# Patient Record
Sex: FEMALE | Race: WHITE | Employment: OTHER | URBAN - METROPOLITAN AREA
[De-identification: names, ages, dates, MRNs, and addresses within clinical notes are randomized per-mention and may not be internally consistent; named-entity substitution may affect disease eponyms.]

---

## 2017-01-25 ENCOUNTER — NURSE ONLY (OUTPATIENT)
Dept: FAMILY MEDICINE CLINIC | Facility: CLINIC | Age: 82
End: 2017-01-25

## 2017-01-25 DIAGNOSIS — M81.0 OSTEOPOROSIS: Primary | ICD-10-CM

## 2017-01-25 PROCEDURE — 96372 THER/PROPH/DIAG INJ SC/IM: CPT | Performed by: FAMILY MEDICINE

## 2017-01-26 ENCOUNTER — PATIENT OUTREACH (OUTPATIENT)
Dept: FAMILY MEDICINE CLINIC | Facility: CLINIC | Age: 82
End: 2017-01-26

## 2017-01-26 NOTE — PROGRESS NOTES
Called Pt. To inform that she was due for her Medicare Annual Wellness Visit. Scheduled for 02/17/17.

## 2017-02-17 ENCOUNTER — OFFICE VISIT (OUTPATIENT)
Dept: FAMILY MEDICINE CLINIC | Facility: CLINIC | Age: 82
End: 2017-02-17

## 2017-02-17 VITALS
DIASTOLIC BLOOD PRESSURE: 74 MMHG | HEART RATE: 98 BPM | WEIGHT: 127.81 LBS | BODY MASS INDEX: 25.42 KG/M2 | HEIGHT: 59.45 IN | SYSTOLIC BLOOD PRESSURE: 132 MMHG

## 2017-02-17 DIAGNOSIS — Z78.0 POSTMENOPAUSAL: ICD-10-CM

## 2017-02-17 DIAGNOSIS — E78.6 LOW HDL (UNDER 40): ICD-10-CM

## 2017-02-17 DIAGNOSIS — E78.00 HYPERCHOLESTEROLEMIA: Primary | ICD-10-CM

## 2017-02-17 DIAGNOSIS — J30.1 SEASONAL ALLERGIC RHINITIS DUE TO POLLEN: ICD-10-CM

## 2017-02-17 DIAGNOSIS — E03.9 ACQUIRED HYPOTHYROIDISM: ICD-10-CM

## 2017-02-17 DIAGNOSIS — Z00.00 ENCOUNTER FOR ANNUAL HEALTH EXAMINATION: ICD-10-CM

## 2017-02-17 DIAGNOSIS — M81.0 POSTMENOPAUSAL OSTEOPOROSIS: ICD-10-CM

## 2017-02-17 DIAGNOSIS — R79.89 ELEVATED SERUM CREATININE: ICD-10-CM

## 2017-02-17 PROCEDURE — 96160 PT-FOCUSED HLTH RISK ASSMT: CPT | Performed by: FAMILY MEDICINE

## 2017-02-17 NOTE — PATIENT INSTRUCTIONS
Routine Healthcare for Women   Routine checkups can find treatable problems early. For many medical problems, early treatment can help prevent more serious complications. The value of checkups and how often you have them depend mainly on your age.  Your per history of high cholesterol. Colorectal cancer test: if you are 48 or older.  Recommended tests include a yearly test for blood in the stool, called the fecal occult blood test (FOBT) or fecal immunochemical test (FIT), and one of the following tests:   s history. Many other tests are often done at routine checkups, but there is no current evidence that they are helpful as routine screening tests for healthy women. Examples of such tests are a CBC (complete blood count), thyroid tests, and urine tests.  Wh medical condition, such as diabetes. Varicella (chickenpox) if you have never had chickenpox. Zoster (shingles) vaccine: if you are 60 or older. The vaccine can help prevent shingles. It can also reduce the pain caused by shingles.    What other things http://www. idph.state. [place]/public/books/advin.htm  A link to the [employer]. This site has a lot of good information including definitions of the different types of Advance Directives.  It also has the State forms available on it's webs

## 2017-02-25 NOTE — PROGRESS NOTES
HPI:   Sunny Quinones is a 80year old female who presents for a MA (Medicare Advantage) Supervisit (Once per calendar year).          Patient Care Team: Patient Care Team:  Ofe Necessary, DO as PCP - [facility])    Patient Active Pr that includes hysterectomy ();  delivery only (, , , ); and Cataract extraction w/  intraocular lens implant (Bilateral, 8/15/13, 13).     Her family history includes Cancer in her brother, brother, maternal grandfather, and no JVD, supple, symmetrical, trachea midline and thyroid not enlarged, symmetric, no tenderness/mass/nodules  Lungs: clear to auscultation bilaterally  Heart: S1, S2 normal, no murmur, click, rub or gallop, regular rate and rhythm  Abdomen: soft, non-tende Future  - LIPID PANEL; Future    3. Acquired hypothyroidism  Stable. Continue levothyroxine 50 µg daily.  - CBC W/DIFF; Future  - TSH; Future  - T4 FREE; Future    4. Postmenopausal osteoporosis  Patient counseled on calcium and vitamin D supplementation. daily aspirin therapy For the following reasons:   Patient decided that the risks of aspirin therapy outweigh the benefits and declines aspirin therapy          Diet assessment: good     Advanced Directive:  Living Will on file in [address] [place] [address] Rd?   Kulwant Munguia activities? : No    Memory Problems?: No      Fall/Risk Assessment     Do you have 3 or more medical conditions?: 0-No    Have you fallen in the last 12 months?: 0-No    Do you accidently lose urine?: 0-No    Do you have difficulty seeing?: 0-No    Do you display for this patient. Update Health Maintenance if applicable    Flex Sigmoidoscopy Screen every 10 years No results found for this or any previous visit. No flowsheet data found.      Fecal Occult Blood Annually No results found for: FOB No flowsheet d No flowsheet data found. BUN  Annually BUN (mg/dL)   Date Value   09/15/2016 14   03/26/2014 11     UREA NITROGEN (BUN) (mg/dL)   Date Value   02/12/2015 16    No flowsheet data found.      Drug Serum Conc  Annually No results found for: DIGOXIN, DIG,

## 2017-03-01 ENCOUNTER — HOSPITAL ENCOUNTER (OUTPATIENT)
Dept: BONE DENSITY | Age: 82
Discharge: HOME | End: 2017-03-01
Attending: FAMILY MEDICINE
Payer: MEDICARE

## 2017-03-01 DIAGNOSIS — Z78.0 POSTMENOPAUSAL: ICD-10-CM

## 2017-03-01 DIAGNOSIS — M81.0 POSTMENOPAUSAL OSTEOPOROSIS: ICD-10-CM

## 2017-03-01 PROCEDURE — 77080 DXA BONE DENSITY AXIAL: CPT

## 2017-03-07 ENCOUNTER — TELEPHONE (OUTPATIENT)
Dept: FAMILY MEDICINE CLINIC | Facility: CLINIC | Age: 82
End: 2017-03-07

## 2017-03-07 NOTE — TELEPHONE ENCOUNTER
----- Message from Ronda Harp DO sent at 3/7/2017  1:03 AM CST -----  Please call patient: Bone density improving with the Prolia.   Please verify patient is taking a calcium and vitamin D combination supplement such as Citracal Caltrate or Viactiv, i

## 2017-04-19 RX ORDER — SIMVASTATIN 40 MG
TABLET ORAL
Qty: 30 TABLET | Refills: 3 | Status: SHIPPED | OUTPATIENT
Start: 2017-04-19 | End: 2017-08-17

## 2017-08-17 ENCOUNTER — OFFICE VISIT (OUTPATIENT)
Dept: FAMILY MEDICINE CLINIC | Facility: CLINIC | Age: 82
End: 2017-08-17

## 2017-08-17 VITALS
HEIGHT: 59.45 IN | RESPIRATION RATE: 16 BRPM | SYSTOLIC BLOOD PRESSURE: 132 MMHG | HEART RATE: 86 BPM | BODY MASS INDEX: 25.79 KG/M2 | DIASTOLIC BLOOD PRESSURE: 74 MMHG | WEIGHT: 129.63 LBS

## 2017-08-17 DIAGNOSIS — E78.6 LOW HDL (UNDER 40): ICD-10-CM

## 2017-08-17 DIAGNOSIS — R73.01 IMPAIRED FASTING GLUCOSE: ICD-10-CM

## 2017-08-17 DIAGNOSIS — M81.0 POSTMENOPAUSAL OSTEOPOROSIS: ICD-10-CM

## 2017-08-17 DIAGNOSIS — E78.00 HYPERCHOLESTEROLEMIA: Primary | ICD-10-CM

## 2017-08-17 DIAGNOSIS — E03.9 ACQUIRED HYPOTHYROIDISM: ICD-10-CM

## 2017-08-17 DIAGNOSIS — Z79.899 ENCOUNTER FOR LONG-TERM CURRENT USE OF MEDICATION: ICD-10-CM

## 2017-08-17 PROCEDURE — 99214 OFFICE O/P EST MOD 30 MIN: CPT | Performed by: FAMILY MEDICINE

## 2017-08-17 RX ORDER — LEVOTHYROXINE SODIUM 0.05 MG/1
50 TABLET ORAL
Qty: 90 TABLET | Refills: 3 | Status: SHIPPED | OUTPATIENT
Start: 2017-08-17 | End: 2018-09-10

## 2017-08-17 RX ORDER — SIMVASTATIN 40 MG
40 TABLET ORAL NIGHTLY
Qty: 90 TABLET | Refills: 3 | Status: SHIPPED | OUTPATIENT
Start: 2017-08-17 | End: 2018-01-17

## 2017-08-17 NOTE — PROGRESS NOTES
Kandy Bhatia is a 80year old female. HPI:     Hypothyroidism: Stable. Patient has been on the same dose of levothyroxine for about 8 years. Denies side effects the levothyroxine such as chest pain, palpitations, changes in hair skin or nails. Smokeless tobacco: Never Used                      Alcohol use:  No                  Family History   Problem Relation Age of Onset   • Heart Disorder Father    • Renal Disease Mother      renal failure   • Stroke Paternal Gr weakness  PSYCH: affect normal, no apparent thought d/o    DATA:    Component      Latest Ref Rng 9/15/2016   Glucose      70-99 mg/dL 109 (H)   BUN      8-20 mg/dL 14   CREATININE      0.55-1.02 mg/dL 1.04 (H)   GFR      >=60 50 (L)   CALCIUM      8.3-10. Encounter for long-term current use of medication  Medication use, risks, benefits, side effects and precautions discussed, patient verbalizes understanding. Questions encouraged and answered to patient's satisfaction.     6. Impaired fasting glucose  Check

## 2017-08-17 NOTE — PATIENT INSTRUCTIONS
We will call you when the Prolia comes in. Preventing Osteoporosis: Meeting Your Calcium Needs    Your body needs calcium to build and repair bones. But it can't make calcium on its own.  That's why it's important to eat calcium-rich food instructions.

## 2017-08-21 ENCOUNTER — NURSE ONLY (OUTPATIENT)
Dept: FAMILY MEDICINE CLINIC | Facility: CLINIC | Age: 82
End: 2017-08-21

## 2017-08-21 ENCOUNTER — LAB ENCOUNTER (OUTPATIENT)
Dept: LAB | Age: 82
End: 2017-08-21
Attending: FAMILY MEDICINE
Payer: MEDICARE

## 2017-08-21 DIAGNOSIS — R79.89 ELEVATED SERUM CREATININE: ICD-10-CM

## 2017-08-21 DIAGNOSIS — E03.9 ACQUIRED HYPOTHYROIDISM: ICD-10-CM

## 2017-08-21 DIAGNOSIS — M81.0 POSTMENOPAUSAL OSTEOPOROSIS: ICD-10-CM

## 2017-08-21 DIAGNOSIS — R73.01 IMPAIRED FASTING GLUCOSE: ICD-10-CM

## 2017-08-21 DIAGNOSIS — E78.00 HYPERCHOLESTEROLEMIA: ICD-10-CM

## 2017-08-21 DIAGNOSIS — E78.6 LOW HDL (UNDER 40): ICD-10-CM

## 2017-08-21 LAB
25-HYDROXYVITAMIN D (TOTAL): 34.1 NG/ML (ref 30–100)
ALBUMIN SERPL-MCNC: 3.8 G/DL (ref 3.5–4.8)
ALP LIVER SERPL-CCNC: 48 U/L (ref 55–142)
ALT SERPL-CCNC: 27 U/L (ref 14–54)
AST SERPL-CCNC: 23 U/L (ref 15–41)
BASOPHILS # BLD AUTO: 0.06 X10(3) UL (ref 0–0.1)
BASOPHILS NFR BLD AUTO: 1 %
BILIRUB SERPL-MCNC: 0.8 MG/DL (ref 0.1–2)
BUN BLD-MCNC: 17 MG/DL (ref 8–20)
CALCIUM BLD-MCNC: 9.7 MG/DL (ref 8.3–10.3)
CHLORIDE: 104 MMOL/L (ref 101–111)
CHOLEST SMN-MCNC: 136 MG/DL (ref ?–200)
CO2: 28 MMOL/L (ref 22–32)
CREAT BLD-MCNC: 1.02 MG/DL (ref 0.55–1.02)
EOSINOPHIL # BLD AUTO: 0.15 X10(3) UL (ref 0–0.3)
EOSINOPHIL NFR BLD AUTO: 2.6 %
ERYTHROCYTE [DISTWIDTH] IN BLOOD BY AUTOMATED COUNT: 12.6 % (ref 11.5–16)
FREE T4: 1.1 NG/DL (ref 0.9–1.8)
GLUCOSE BLD-MCNC: 117 MG/DL (ref 70–99)
HCT VFR BLD AUTO: 42.5 % (ref 34–50)
HDLC SERPL-MCNC: 61 MG/DL (ref 45–?)
HDLC SERPL: 2.23 {RATIO} (ref ?–4.44)
HGB BLD-MCNC: 14 G/DL (ref 12–16)
IMMATURE GRANULOCYTE COUNT: 0.01 X10(3) UL (ref 0–1)
IMMATURE GRANULOCYTE RATIO %: 0.2 %
LDLC SERPL CALC-MCNC: 57 MG/DL (ref ?–130)
LDLC SERPL-MCNC: 18 MG/DL (ref 5–40)
LYMPHOCYTES # BLD AUTO: 1.54 X10(3) UL (ref 0.9–4)
LYMPHOCYTES NFR BLD AUTO: 26.7 %
M PROTEIN MFR SERPL ELPH: 7.6 G/DL (ref 6.1–8.3)
MCH RBC QN AUTO: 31.4 PG (ref 27–33.2)
MCHC RBC AUTO-ENTMCNC: 32.9 G/DL (ref 31–37)
MCV RBC AUTO: 95.3 FL (ref 81–100)
MONOCYTES # BLD AUTO: 0.55 X10(3) UL (ref 0.1–0.6)
MONOCYTES NFR BLD AUTO: 9.5 %
NEUTROPHIL ABS PRELIM: 3.45 X10 (3) UL (ref 1.3–6.7)
NEUTROPHILS # BLD AUTO: 3.45 X10(3) UL (ref 1.3–6.7)
NEUTROPHILS NFR BLD AUTO: 60 %
NONHDLC SERPL-MCNC: 75 MG/DL (ref ?–130)
PLATELET # BLD AUTO: 325 10(3)UL (ref 150–450)
POTASSIUM SERPL-SCNC: 4 MMOL/L (ref 3.6–5.1)
RBC # BLD AUTO: 4.46 X10(6)UL (ref 3.8–5.1)
RED CELL DISTRIBUTION WIDTH-SD: 44.2 FL (ref 35.1–46.3)
SODIUM SERPL-SCNC: 138 MMOL/L (ref 136–144)
TRIGLYCERIDES: 92 MG/DL (ref ?–150)
TSI SER-ACNC: 1.69 MIU/ML (ref 0.35–5.5)
WBC # BLD AUTO: 5.8 X10(3) UL (ref 4–13)

## 2017-08-21 PROCEDURE — 36415 COLL VENOUS BLD VENIPUNCTURE: CPT | Performed by: FAMILY MEDICINE

## 2017-08-21 PROCEDURE — 96372 THER/PROPH/DIAG INJ SC/IM: CPT | Performed by: FAMILY MEDICINE

## 2017-08-22 LAB
EST. AVERAGE GLUCOSE BLD GHB EST-MCNC: 131 MG/DL (ref 68–126)
HBA1C MFR BLD HPLC: 6.2 % (ref ?–5.7)

## 2017-08-29 ENCOUNTER — TELEPHONE (OUTPATIENT)
Dept: FAMILY MEDICINE CLINIC | Facility: CLINIC | Age: 82
End: 2017-08-29

## 2017-08-29 NOTE — TELEPHONE ENCOUNTER
----- Message from Jori Solis DO sent at 8/28/2017 10:52 PM CDT -----  Please call pt: labs normal or stable.

## 2018-01-17 ENCOUNTER — OFFICE VISIT (OUTPATIENT)
Dept: FAMILY MEDICINE CLINIC | Facility: CLINIC | Age: 83
End: 2018-01-17

## 2018-01-17 VITALS
HEIGHT: 59.45 IN | BODY MASS INDEX: 25.94 KG/M2 | SYSTOLIC BLOOD PRESSURE: 134 MMHG | RESPIRATION RATE: 16 BRPM | HEART RATE: 92 BPM | WEIGHT: 130.38 LBS | DIASTOLIC BLOOD PRESSURE: 72 MMHG

## 2018-01-17 DIAGNOSIS — Z00.00 MEDICARE ANNUAL WELLNESS VISIT, SUBSEQUENT: Primary | ICD-10-CM

## 2018-01-17 DIAGNOSIS — Z23 NEED FOR VACCINATION: ICD-10-CM

## 2018-01-17 DIAGNOSIS — E78.6 LOW HDL (UNDER 40): ICD-10-CM

## 2018-01-17 DIAGNOSIS — E03.9 ACQUIRED HYPOTHYROIDISM: ICD-10-CM

## 2018-01-17 DIAGNOSIS — M89.9 BONE DISORDER: ICD-10-CM

## 2018-01-17 DIAGNOSIS — M81.0 POST-MENOPAUSAL OSTEOPOROSIS: ICD-10-CM

## 2018-01-17 DIAGNOSIS — E78.00 HYPERCHOLESTEROLEMIA: ICD-10-CM

## 2018-01-17 DIAGNOSIS — J30.1 SEASONAL ALLERGIC RHINITIS DUE TO POLLEN: ICD-10-CM

## 2018-01-17 PROCEDURE — G0439 PPPS, SUBSEQ VISIT: HCPCS | Performed by: FAMILY MEDICINE

## 2018-01-17 PROCEDURE — 96160 PT-FOCUSED HLTH RISK ASSMT: CPT | Performed by: FAMILY MEDICINE

## 2018-01-17 RX ORDER — SIMVASTATIN 40 MG
40 TABLET ORAL NIGHTLY
Qty: 90 TABLET | Refills: 3 | Status: SHIPPED | OUTPATIENT
Start: 2018-01-17 | End: 2019-02-21

## 2018-01-17 NOTE — PROGRESS NOTES
HPI:   Milla Wilkinson is a 80year old female who presents for a MA (Medicare Advantage) Supervisit (Once per calendar year).            Fall/Risk Assessment   She has been screened for Falls and is low risk: Fall/Risk Scorin    Cognitive Assessm pollen     Bone disorder    Wt Readings from Last 3 Encounters:  01/17/18 : 130 lb 6.4 oz  08/17/17 : 129 lb 9.6 oz  02/17/17 : 127 lb 12.8 oz     Last Cholesterol Labs:     Lab Results  Component Value Date   CHOLEST 136 08/21/2017   HDL 61 08/21/2017   L maternal grandfather, and sister; Heart Disorder in her father; Renal Disease in her mother and paternal grandmother; Stroke in her maternal grandmother and paternal grandfather. SOCIAL HISTORY:   She  reports that she has never smoked.  She has never use edema  Pulses: 2+ and symmetric  Lymph nodes: No cervical or supraclavicular adenopathy  Neurologic: Grossly normal  Psych: Normal affect    Vaccination History     Immunization History   Administered Date(s) Administered   • Influenza Vaccine, High Dose, seasonally. 8. Need for vaccination  - PNEUMOCOCCAL IMM (PNEUMOVAX)        PLAN SUMMARY:     AS ABOVE     Diet assessment: good     PLAN:  The patient indicates understanding of these issues and agrees to the plan.   Reinforced healthy diet, lifestyle, a Glaucoma Screening      Ophthalmology Visit Annually: Diabetics, FHx Glaucoma, AA>50, > 65 No flowsheet data found.     Bone Density Screening      Dexascan Every two years Last Dexa Scan:   XR DEXA BONE DENSITOMETRY (CPT=77080) 03/01/2017    No f

## 2018-01-17 NOTE — PATIENT INSTRUCTIONS
Please get your blood tests done, then make         [address] [place]    Lab appointments can now be scheduled online at [email or web address]    · Go to [email or web address]  · In Search type Lab  · Click \"Lab services\"  · Click \"S you are over 70, ask your healthcare provider how often you should have a mammogram.   Pap test: at least every 3 years if you have ever had sex and have not had your uterus removed.  Your healthcare provider may recommend more frequent screening if you hav you have an increased risk (for example, because you smoke or do not get regular exercise). Osteoporosis is a disease that thins and weakens bones to the point where they break easily.    Hearing test: if you are 72 or older   Vision test: if you are 65 or adults, age 15 to 25 years, who have not had hepatitis or a hepatitis shot and for all adults who are at risk of infection.  This includes, for example, women who have more than 1 sex partner or whose partner has more than 1 partner, or who have a sexually long-term relationship with just one partner who has no other partners. Hormone use: During or after menopause, discuss the risks and benefits of use of estrogen and progesterone replacement with your healthcare provider. Osteoporosis prevention:   Lino Cummings

## 2018-01-19 PROBLEM — M89.9 BONE DISORDER: Status: ACTIVE | Noted: 2018-01-19

## 2018-01-19 PROBLEM — Z78.0 POSTMENOPAUSAL: Status: RESOLVED | Noted: 2017-02-17 | Resolved: 2018-01-19

## 2018-01-19 PROBLEM — J30.1 SEASONAL ALLERGIC RHINITIS DUE TO POLLEN: Status: ACTIVE | Noted: 2018-01-19

## 2018-01-19 PROBLEM — M81.0 POSTMENOPAUSAL OSTEOPOROSIS: Status: RESOLVED | Noted: 2017-02-17 | Resolved: 2018-01-19

## 2018-01-19 PROBLEM — M81.0 POST-MENOPAUSAL OSTEOPOROSIS: Status: ACTIVE | Noted: 2017-02-17

## 2018-01-25 ENCOUNTER — LAB ENCOUNTER (OUTPATIENT)
Dept: LAB | Age: 83
End: 2018-01-25
Attending: FAMILY MEDICINE
Payer: MEDICARE

## 2018-01-25 ENCOUNTER — NURSE ONLY (OUTPATIENT)
Dept: FAMILY MEDICINE CLINIC | Facility: CLINIC | Age: 83
End: 2018-01-25

## 2018-01-25 DIAGNOSIS — E03.9 ACQUIRED HYPOTHYROIDISM: ICD-10-CM

## 2018-01-25 DIAGNOSIS — E78.6 LOW HDL (UNDER 40): ICD-10-CM

## 2018-01-25 DIAGNOSIS — E78.00 HYPERCHOLESTEROLEMIA: ICD-10-CM

## 2018-01-25 DIAGNOSIS — M89.9 BONE DISORDER: ICD-10-CM

## 2018-01-25 LAB
25-HYDROXYVITAMIN D (TOTAL): 24.4 NG/ML (ref 30–100)
ALBUMIN SERPL-MCNC: 3.9 G/DL (ref 3.5–4.8)
ALP LIVER SERPL-CCNC: 44 U/L (ref 55–142)
ALT SERPL-CCNC: 25 U/L (ref 14–54)
AST SERPL-CCNC: 23 U/L (ref 15–41)
BASOPHILS # BLD AUTO: 0.03 X10(3) UL (ref 0–0.1)
BASOPHILS NFR BLD AUTO: 0.5 %
BILIRUB SERPL-MCNC: 0.5 MG/DL (ref 0.1–2)
BUN BLD-MCNC: 15 MG/DL (ref 8–20)
CALCIUM BLD-MCNC: 9.1 MG/DL (ref 8.3–10.3)
CHLORIDE: 106 MMOL/L (ref 101–111)
CHOLEST SMN-MCNC: 138 MG/DL (ref ?–200)
CO2: 27 MMOL/L (ref 22–32)
CREAT BLD-MCNC: 1.01 MG/DL (ref 0.55–1.02)
EOSINOPHIL # BLD AUTO: 0.11 X10(3) UL (ref 0–0.3)
EOSINOPHIL NFR BLD AUTO: 1.7 %
ERYTHROCYTE [DISTWIDTH] IN BLOOD BY AUTOMATED COUNT: 12.6 % (ref 11.5–16)
FREE T4: 1.1 NG/DL (ref 0.9–1.8)
GLUCOSE BLD-MCNC: 115 MG/DL (ref 70–99)
HAV IGM SER QL: 2.3 MG/DL (ref 1.7–3)
HCT VFR BLD AUTO: 43.4 % (ref 34–50)
HDLC SERPL-MCNC: 53 MG/DL (ref 45–?)
HDLC SERPL: 2.6 {RATIO} (ref ?–4.44)
HGB BLD-MCNC: 14.3 G/DL (ref 12–16)
IMMATURE GRANULOCYTE COUNT: 0.01 X10(3) UL (ref 0–1)
IMMATURE GRANULOCYTE RATIO %: 0.2 %
LDLC SERPL CALC-MCNC: 61 MG/DL (ref ?–130)
LYMPHOCYTES # BLD AUTO: 1.69 X10(3) UL (ref 0.9–4)
LYMPHOCYTES NFR BLD AUTO: 25.5 %
M PROTEIN MFR SERPL ELPH: 7.8 G/DL (ref 6.1–8.3)
MCH RBC QN AUTO: 31.7 PG (ref 27–33.2)
MCHC RBC AUTO-ENTMCNC: 32.9 G/DL (ref 31–37)
MCV RBC AUTO: 96.2 FL (ref 81–100)
MONOCYTES # BLD AUTO: 0.53 X10(3) UL (ref 0.1–0.6)
MONOCYTES NFR BLD AUTO: 8 %
NEUTROPHIL ABS PRELIM: 4.25 X10 (3) UL (ref 1.3–6.7)
NEUTROPHILS # BLD AUTO: 4.25 X10(3) UL (ref 1.3–6.7)
NEUTROPHILS NFR BLD AUTO: 64.1 %
NONHDLC SERPL-MCNC: 85 MG/DL (ref ?–130)
PHOSPHATE SERPL-MCNC: 3.1 MG/DL (ref 2.5–4.9)
PLATELET # BLD AUTO: 316 10(3)UL (ref 150–450)
POTASSIUM SERPL-SCNC: 4.2 MMOL/L (ref 3.6–5.1)
RBC # BLD AUTO: 4.51 X10(6)UL (ref 3.8–5.1)
RED CELL DISTRIBUTION WIDTH-SD: 45.2 FL (ref 35.1–46.3)
SODIUM SERPL-SCNC: 137 MMOL/L (ref 136–144)
TRIGL SERPL-MCNC: 121 MG/DL (ref ?–150)
TSI SER-ACNC: 1.61 MIU/ML (ref 0.35–5.5)
VLDLC SERPL CALC-MCNC: 24 MG/DL (ref 5–40)
WBC # BLD AUTO: 6.6 X10(3) UL (ref 4–13)

## 2018-01-25 PROCEDURE — 80061 LIPID PANEL: CPT

## 2018-01-25 PROCEDURE — 90732 PPSV23 VACC 2 YRS+ SUBQ/IM: CPT | Performed by: FAMILY MEDICINE

## 2018-01-25 PROCEDURE — 36415 COLL VENOUS BLD VENIPUNCTURE: CPT

## 2018-01-25 PROCEDURE — 80053 COMPREHEN METABOLIC PANEL: CPT

## 2018-01-25 PROCEDURE — 84443 ASSAY THYROID STIM HORMONE: CPT

## 2018-01-25 PROCEDURE — 85025 COMPLETE CBC W/AUTO DIFF WBC: CPT

## 2018-01-25 PROCEDURE — 84100 ASSAY OF PHOSPHORUS: CPT

## 2018-01-25 PROCEDURE — 84439 ASSAY OF FREE THYROXINE: CPT

## 2018-01-25 PROCEDURE — 83735 ASSAY OF MAGNESIUM: CPT

## 2018-01-25 PROCEDURE — G0009 ADMIN PNEUMOCOCCAL VACCINE: HCPCS | Performed by: FAMILY MEDICINE

## 2018-01-25 PROCEDURE — 82306 VITAMIN D 25 HYDROXY: CPT

## 2018-01-25 PROCEDURE — 96372 THER/PROPH/DIAG INJ SC/IM: CPT | Performed by: FAMILY MEDICINE

## 2018-01-25 NOTE — PROGRESS NOTES
Pt here for Prolia and Pneumovax 23 injections, pt tolerated injections well. Next Prolia injection due 7/25/18.

## 2018-01-29 ENCOUNTER — TELEPHONE (OUTPATIENT)
Dept: FAMILY MEDICINE CLINIC | Facility: CLINIC | Age: 83
End: 2018-01-29

## 2018-01-29 DIAGNOSIS — E55.9 VITAMIN D DEFICIENCY: Primary | ICD-10-CM

## 2018-01-29 RX ORDER — ERGOCALCIFEROL 1.25 MG/1
50000 CAPSULE ORAL WEEKLY
Qty: 12 CAPSULE | Refills: 0 | Status: SHIPPED | OUTPATIENT
Start: 2018-01-29 | End: 2018-04-29

## 2018-01-29 NOTE — TELEPHONE ENCOUNTER
Pt informed of test results and recommendations. Pt verbalized understanding and had no questions at this time. Prescription sent to local pharmacy, future orders in the system to be completed in 3 months.

## 2018-01-29 NOTE — PROGRESS NOTES
sugar mildly elevated -  reduce carbs and sugars  Vit D low - Vit D 50,000 units weekly x 12 wks, then recheck Vit D upon completion  -rest of labs ok

## 2018-01-29 NOTE — TELEPHONE ENCOUNTER
----- Message from Shazia Harper MD sent at 1/29/2018  9:34 AM CST -----  sugar mildly elevated -  reduce carbs and sugars  Vit D low - Vit D 50,000 units weekly x 12 wks, then recheck Vit D upon completion  -rest of labs ok

## 2018-04-24 ENCOUNTER — LAB ENCOUNTER (OUTPATIENT)
Dept: LAB | Age: 83
End: 2018-04-24
Attending: FAMILY MEDICINE
Payer: MEDICARE

## 2018-04-24 DIAGNOSIS — E55.9 VITAMIN D DEFICIENCY: ICD-10-CM

## 2018-04-24 PROCEDURE — 82306 VITAMIN D 25 HYDROXY: CPT

## 2018-04-24 PROCEDURE — 36415 COLL VENOUS BLD VENIPUNCTURE: CPT

## 2018-04-30 ENCOUNTER — TELEPHONE (OUTPATIENT)
Dept: FAMILY MEDICINE CLINIC | Facility: CLINIC | Age: 83
End: 2018-04-30

## 2018-05-03 NOTE — TELEPHONE ENCOUNTER
Per Dr. Fran Mariano, the patient was informed that her vitamin d is improved. Patient verbalized understanding.

## 2018-08-08 ENCOUNTER — OFFICE VISIT (OUTPATIENT)
Dept: FAMILY MEDICINE CLINIC | Facility: CLINIC | Age: 83
End: 2018-08-08

## 2018-08-08 VITALS
HEART RATE: 83 BPM | RESPIRATION RATE: 16 BRPM | HEIGHT: 59.45 IN | SYSTOLIC BLOOD PRESSURE: 132 MMHG | WEIGHT: 131.38 LBS | BODY MASS INDEX: 26.13 KG/M2 | DIASTOLIC BLOOD PRESSURE: 70 MMHG

## 2018-08-08 DIAGNOSIS — E78.00 HYPERCHOLESTEROLEMIA: ICD-10-CM

## 2018-08-08 DIAGNOSIS — Z79.899 ENCOUNTER FOR LONG-TERM CURRENT USE OF MEDICATION: ICD-10-CM

## 2018-08-08 DIAGNOSIS — M81.0 POST-MENOPAUSAL OSTEOPOROSIS: Primary | ICD-10-CM

## 2018-08-08 DIAGNOSIS — E78.6 LOW HDL (UNDER 40): ICD-10-CM

## 2018-08-08 DIAGNOSIS — E03.9 ACQUIRED HYPOTHYROIDISM: ICD-10-CM

## 2018-08-08 PROCEDURE — 99214 OFFICE O/P EST MOD 30 MIN: CPT | Performed by: FAMILY MEDICINE

## 2018-08-08 PROCEDURE — 96372 THER/PROPH/DIAG INJ SC/IM: CPT | Performed by: FAMILY MEDICINE

## 2018-08-08 RX ORDER — SIMVASTATIN 40 MG
40 TABLET ORAL NIGHTLY
Qty: 90 TABLET | Refills: 3 | Status: CANCELLED | OUTPATIENT
Start: 2018-08-08

## 2018-08-08 RX ORDER — LEVOTHYROXINE SODIUM 0.05 MG/1
50 TABLET ORAL
Qty: 90 TABLET | Refills: 3 | Status: CANCELLED | OUTPATIENT
Start: 2018-08-08

## 2018-08-08 NOTE — PROGRESS NOTES
Yves Quintero is a 80year old female. HPI:     Hypothyroidism:   Stable. Patient has been on the same dose of levothyroxine for about 8 or more years.     Denies side effects the levothyroxine such as chest pain, palpitations, changes in hair Smokeless tobacco: Never Used                      Alcohol use:  No                  Family History   Problem Relation Age of Onset   • Heart Disorder Father    • Renal Disease Mother      renal failure   • Stroke no masses, no HSM  EXTREMITIES: no cyanosis or clubbing  NEURO: Alert and Oriented x3, CN II-XII grossly intact, no focal weakness  PSYCH: affect normal, no apparent thought d/o      DATA:    Component      Latest Ref Rng & Units 1/25/2018 8/21/2017   Gluc THYROXINE); Future  - VITAMIN D, 25-HYDROXY; Future  - MAGNESIUM; Future  - PHOSPHORUS; Future    2. Hypercholesterolemia  Stable. Continue simvastatin 40 mg daily. Check labs in 6 months.    - COMP METABOLIC PANEL (14); Future  - LIPID PANEL;  Future

## 2018-08-08 NOTE — PATIENT INSTRUCTIONS
Preventing Osteoporosis: Meeting Your Calcium Needs    Your body needs calcium to build and repair bones. But it can't make calcium on its own. That's why it's important to eat calcium-rich foods. Some foods are naturally rich in calcium.  Others have josé miguel

## 2018-09-10 DIAGNOSIS — E03.9 ACQUIRED HYPOTHYROIDISM: ICD-10-CM

## 2018-09-10 DIAGNOSIS — Z79.899 ENCOUNTER FOR LONG-TERM CURRENT USE OF MEDICATION: ICD-10-CM

## 2018-09-11 RX ORDER — LEVOTHYROXINE SODIUM 0.05 MG/1
TABLET ORAL
Qty: 90 TABLET | Refills: 1 | Status: SHIPPED | OUTPATIENT
Start: 2018-09-11 | End: 2019-02-21

## 2019-02-21 ENCOUNTER — OFFICE VISIT (OUTPATIENT)
Dept: FAMILY MEDICINE CLINIC | Facility: CLINIC | Age: 84
End: 2019-02-21
Payer: MEDICARE

## 2019-02-21 VITALS
WEIGHT: 128 LBS | HEART RATE: 84 BPM | HEIGHT: 59.75 IN | BODY MASS INDEX: 25.13 KG/M2 | SYSTOLIC BLOOD PRESSURE: 110 MMHG | DIASTOLIC BLOOD PRESSURE: 60 MMHG

## 2019-02-21 DIAGNOSIS — E03.9 ACQUIRED HYPOTHYROIDISM: ICD-10-CM

## 2019-02-21 DIAGNOSIS — M89.9 BONE DISORDER: ICD-10-CM

## 2019-02-21 DIAGNOSIS — Z79.899 ENCOUNTER FOR LONG-TERM CURRENT USE OF MEDICATION: ICD-10-CM

## 2019-02-21 DIAGNOSIS — M81.0 POST-MENOPAUSAL OSTEOPOROSIS: ICD-10-CM

## 2019-02-21 DIAGNOSIS — Z00.00 MEDICARE ANNUAL WELLNESS VISIT, SUBSEQUENT: Primary | ICD-10-CM

## 2019-02-21 DIAGNOSIS — J30.1 SEASONAL ALLERGIC RHINITIS DUE TO POLLEN: ICD-10-CM

## 2019-02-21 DIAGNOSIS — E78.6 LOW HDL (UNDER 40): ICD-10-CM

## 2019-02-21 DIAGNOSIS — E78.00 HYPERCHOLESTEROLEMIA: ICD-10-CM

## 2019-02-21 PROCEDURE — 99397 PER PM REEVAL EST PAT 65+ YR: CPT | Performed by: FAMILY MEDICINE

## 2019-02-21 PROCEDURE — G0439 PPPS, SUBSEQ VISIT: HCPCS | Performed by: FAMILY MEDICINE

## 2019-02-21 PROCEDURE — 96160 PT-FOCUSED HLTH RISK ASSMT: CPT | Performed by: FAMILY MEDICINE

## 2019-02-21 RX ORDER — SIMVASTATIN 40 MG
40 TABLET ORAL NIGHTLY
Qty: 90 TABLET | Refills: 3 | Status: SHIPPED | OUTPATIENT
Start: 2019-02-21 | End: 2019-06-26

## 2019-02-21 RX ORDER — LEVOTHYROXINE SODIUM 0.05 MG/1
50 TABLET ORAL
Qty: 90 TABLET | Refills: 3 | Status: SHIPPED | OUTPATIENT
Start: 2019-02-21 | End: 2019-06-26

## 2019-02-21 NOTE — PATIENT INSTRUCTIONS
Please get blood tests done at your earliest convenience. After we have called you with the results of the blood tests you can make an appointment to see nurse Ian Colvin or nurse Leisa Jaramillo for your Prolia injection.               Recommended Website

## 2019-02-21 NOTE — PROGRESS NOTES
HPI:   Kandy Bhatia is a 80year old female who presents for a MA (Medicare Advantage) Supervisit (Once per calendar year).             Fall/Risk Assessment   She has been screened for Falls and is low risk: Fall/Risk Scorin    Cognitive Assess Encounters:  02/21/19 : 128 lb  10/20/18 : 130 lb  08/08/18 : 131 lb 6.4 oz     Last Cholesterol Labs:   Lab Results   Component Value Date    CHOLEST 138 01/25/2018    HDL 53 01/25/2018    LDL 61 01/25/2018    TRIG 121 01/25/2018          Last Chemistry L grandfather. SOCIAL HISTORY:   She  reports that  has never smoked. she has never used smokeless tobacco. She reports that she does not drink alcohol or use drugs.      REVIEW OF SYSTEMS:   GENERAL: feels well overall  SKIN: denies any unusual skin lesion strength and tone. Normal symmetric reflexes.  Normal coordination and gait  Psych: Normal affect    Vaccination History     Immunization History   Administered Date(s) Administered   • FLU VACC High Dose 72 YRS & Older PRSV Free (54026) 10/01/2014, 09/28/2 long-term current use of medication  Medication use, risks, benefits, side effects and precautions discussed, patient verbalizes understanding. Questions encouraged and answered to patient's satisfaction.    - Levothyroxine Sodium 50 MCG Oral Tab;  Take 1 t Value   02/12/2015 125 (H)          Cardiovascular Disease Screening     LDL Annually LDL Cholesterol (mg/dL)   Date Value   01/25/2018 61     LDL-CHOLESTEROL (mg/dL (calc))   Date Value   02/12/2015 53        EKG - w/ Initial Preventative Physical Exam on Factor VIII or IX concentrates   Clients of institutions for the mentally retarded   Persons who live in the same house as a HepB virus carrier   Homosexual men   Illicit injectable drug abusers     Tetanus Toxoid  Only covered with a cut with metal- TD an

## 2019-02-27 ENCOUNTER — LAB ENCOUNTER (OUTPATIENT)
Dept: LAB | Age: 84
End: 2019-02-27
Attending: FAMILY MEDICINE
Payer: MEDICARE

## 2019-02-27 DIAGNOSIS — E78.00 HYPERCHOLESTEROLEMIA: ICD-10-CM

## 2019-02-27 DIAGNOSIS — E03.9 ACQUIRED HYPOTHYROIDISM: ICD-10-CM

## 2019-02-27 DIAGNOSIS — M81.0 POST-MENOPAUSAL OSTEOPOROSIS: ICD-10-CM

## 2019-02-27 LAB
ALBUMIN SERPL-MCNC: 3.9 G/DL (ref 3.4–5)
ALBUMIN/GLOB SERPL: 1 {RATIO} (ref 1–2)
ALP LIVER SERPL-CCNC: 57 U/L (ref 55–142)
ALT SERPL-CCNC: 24 U/L (ref 13–56)
ANION GAP SERPL CALC-SCNC: 8 MMOL/L (ref 0–18)
AST SERPL-CCNC: 25 U/L (ref 15–37)
BASOPHILS # BLD AUTO: 0.05 X10(3) UL (ref 0–0.2)
BASOPHILS NFR BLD AUTO: 0.8 %
BILIRUB SERPL-MCNC: 0.7 MG/DL (ref 0.1–2)
BUN BLD-MCNC: 11 MG/DL (ref 7–18)
BUN/CREAT SERPL: 11 (ref 10–20)
CALCIUM BLD-MCNC: 10 MG/DL (ref 8.5–10.1)
CHLORIDE SERPL-SCNC: 103 MMOL/L (ref 98–107)
CHOLEST SMN-MCNC: 126 MG/DL (ref ?–200)
CO2 SERPL-SCNC: 26 MMOL/L (ref 21–32)
CREAT BLD-MCNC: 1 MG/DL (ref 0.55–1.02)
DEPRECATED RDW RBC AUTO: 45.1 FL (ref 35.1–46.3)
EOSINOPHIL # BLD AUTO: 0.21 X10(3) UL (ref 0–0.7)
EOSINOPHIL NFR BLD AUTO: 3.2 %
ERYTHROCYTE [DISTWIDTH] IN BLOOD BY AUTOMATED COUNT: 12.6 % (ref 11–15)
GLOBULIN PLAS-MCNC: 3.9 G/DL (ref 2.8–4.4)
GLUCOSE BLD-MCNC: 109 MG/DL (ref 70–99)
HAV IGM SER QL: 2.1 MG/DL (ref 1.6–2.6)
HCT VFR BLD AUTO: 42 % (ref 35–48)
HDLC SERPL-MCNC: 53 MG/DL (ref 40–59)
HGB BLD-MCNC: 13.6 G/DL (ref 12–16)
IMM GRANULOCYTES # BLD AUTO: 0.02 X10(3) UL (ref 0–1)
IMM GRANULOCYTES NFR BLD: 0.3 %
LDLC SERPL CALC-MCNC: 52 MG/DL (ref ?–100)
LYMPHOCYTES # BLD AUTO: 1.7 X10(3) UL (ref 1–4)
LYMPHOCYTES NFR BLD AUTO: 25.8 %
M PROTEIN MFR SERPL ELPH: 7.8 G/DL (ref 6.4–8.2)
MCH RBC QN AUTO: 31.1 PG (ref 26–34)
MCHC RBC AUTO-ENTMCNC: 32.4 G/DL (ref 31–37)
MCV RBC AUTO: 96.1 FL (ref 80–100)
MONOCYTES # BLD AUTO: 0.56 X10(3) UL (ref 0.1–1)
MONOCYTES NFR BLD AUTO: 8.5 %
NEUTROPHILS # BLD AUTO: 4.04 X10 (3) UL (ref 1.5–7.7)
NEUTROPHILS # BLD AUTO: 4.04 X10(3) UL (ref 1.5–7.7)
NEUTROPHILS NFR BLD AUTO: 61.4 %
NONHDLC SERPL-MCNC: 73 MG/DL (ref ?–130)
OSMOLALITY SERPL CALC.SUM OF ELEC: 284 MOSM/KG (ref 275–295)
PHOSPHATE SERPL-MCNC: 3.6 MG/DL (ref 2.5–4.9)
PLATELET # BLD AUTO: 344 10(3)UL (ref 150–450)
POTASSIUM SERPL-SCNC: 3.9 MMOL/L (ref 3.5–5.1)
RBC # BLD AUTO: 4.37 X10(6)UL (ref 3.8–5.3)
SODIUM SERPL-SCNC: 137 MMOL/L (ref 136–145)
T4 FREE SERPL-MCNC: 1.2 NG/DL (ref 0.8–1.7)
TRIGL SERPL-MCNC: 105 MG/DL (ref 30–149)
TSI SER-ACNC: 2.02 MIU/ML (ref 0.36–3.74)
VIT D+METAB SERPL-MCNC: 28.8 NG/ML (ref 30–100)
VLDLC SERPL CALC-MCNC: 21 MG/DL (ref 0–30)
WBC # BLD AUTO: 6.6 X10(3) UL (ref 4–11)

## 2019-02-27 PROCEDURE — 82306 VITAMIN D 25 HYDROXY: CPT

## 2019-02-27 PROCEDURE — 84100 ASSAY OF PHOSPHORUS: CPT

## 2019-02-27 PROCEDURE — 80053 COMPREHEN METABOLIC PANEL: CPT

## 2019-02-27 PROCEDURE — 85025 COMPLETE CBC W/AUTO DIFF WBC: CPT

## 2019-02-27 PROCEDURE — 36415 COLL VENOUS BLD VENIPUNCTURE: CPT

## 2019-02-27 PROCEDURE — 84439 ASSAY OF FREE THYROXINE: CPT

## 2019-02-27 PROCEDURE — 84443 ASSAY THYROID STIM HORMONE: CPT

## 2019-02-27 PROCEDURE — 83735 ASSAY OF MAGNESIUM: CPT

## 2019-02-27 PROCEDURE — 80061 LIPID PANEL: CPT

## 2019-03-15 ENCOUNTER — TELEPHONE (OUTPATIENT)
Dept: FAMILY MEDICINE CLINIC | Facility: CLINIC | Age: 84
End: 2019-03-15

## 2019-03-15 NOTE — TELEPHONE ENCOUNTER
----- Message from Jeremias Villareal DO sent at 3/14/2019  9:07 PM CDT -----  Please call patient: Vitamin D is low, she could take an additional supplement of vitamin D 1000 units once a day, this would be in addition to the calcium and vitamin D supplemen

## 2019-03-16 DIAGNOSIS — E78.00 HYPERCHOLESTEROLEMIA: ICD-10-CM

## 2019-03-16 DIAGNOSIS — E03.9 ACQUIRED HYPOTHYROIDISM: ICD-10-CM

## 2019-03-16 DIAGNOSIS — Z79.899 ENCOUNTER FOR LONG-TERM CURRENT USE OF MEDICATION: ICD-10-CM

## 2019-03-16 DIAGNOSIS — E78.6 LOW HDL (UNDER 40): ICD-10-CM

## 2019-03-18 ENCOUNTER — TELEPHONE (OUTPATIENT)
Dept: FAMILY MEDICINE CLINIC | Facility: CLINIC | Age: 84
End: 2019-03-18

## 2019-03-18 RX ORDER — LEVOTHYROXINE SODIUM 0.05 MG/1
TABLET ORAL
Qty: 90 TABLET | Refills: 0 | OUTPATIENT
Start: 2019-03-18

## 2019-03-18 RX ORDER — SIMVASTATIN 40 MG
TABLET ORAL
Qty: 90 TABLET | Refills: 0 | OUTPATIENT
Start: 2019-03-18

## 2019-03-18 NOTE — TELEPHONE ENCOUNTER
Spoke to patient with instructions and pt verbalized understanding.   Prolia injection will be ordered and pt called once arrive

## 2019-03-18 NOTE — TELEPHONE ENCOUNTER
Shaniqua Head is calling to see if she needs to come in for her Prolia shot, she had some bloodwork done and she has not heard back from the office regarding her test results or her Prolia shot, Please call Shaniqua Head at [phone]

## 2019-03-19 ENCOUNTER — TELEPHONE (OUTPATIENT)
Dept: FAMILY MEDICINE CLINIC | Facility: CLINIC | Age: 84
End: 2019-03-19

## 2019-03-19 DIAGNOSIS — Z79.899 ENCOUNTER FOR LONG-TERM CURRENT USE OF MEDICATION: ICD-10-CM

## 2019-03-19 DIAGNOSIS — E03.9 ACQUIRED HYPOTHYROIDISM: ICD-10-CM

## 2019-03-19 RX ORDER — LEVOTHYROXINE SODIUM 0.05 MG/1
TABLET ORAL
Qty: 90 TABLET | Refills: 1 | OUTPATIENT
Start: 2019-03-19

## 2019-03-19 NOTE — TELEPHONE ENCOUNTER
Pt called requesting refill on Levothyroxine Sodium 50 MCG Oral Tab and simvastatin 40 MG Oral Tab be sent to [facility] DR FROILAN RAGSDALE in [place] on [address]

## 2019-03-22 ENCOUNTER — NURSE ONLY (OUTPATIENT)
Dept: FAMILY MEDICINE CLINIC | Facility: CLINIC | Age: 84
End: 2019-03-22
Payer: MEDICARE

## 2019-03-22 PROCEDURE — 96372 THER/PROPH/DIAG INJ SC/IM: CPT | Performed by: FAMILY MEDICINE

## 2019-06-26 ENCOUNTER — TELEPHONE (OUTPATIENT)
Dept: FAMILY MEDICINE CLINIC | Facility: CLINIC | Age: 84
End: 2019-06-26

## 2019-06-26 DIAGNOSIS — E78.00 HYPERCHOLESTEROLEMIA: ICD-10-CM

## 2019-06-26 DIAGNOSIS — E78.6 LOW HDL (UNDER 40): ICD-10-CM

## 2019-06-26 DIAGNOSIS — E03.9 ACQUIRED HYPOTHYROIDISM: ICD-10-CM

## 2019-06-26 DIAGNOSIS — Z79.899 ENCOUNTER FOR LONG-TERM CURRENT USE OF MEDICATION: ICD-10-CM

## 2019-06-26 RX ORDER — SIMVASTATIN 40 MG
40 TABLET ORAL NIGHTLY
Qty: 90 TABLET | Refills: 3 | Status: SHIPPED | OUTPATIENT
Start: 2019-06-26 | End: 2020-02-27

## 2019-06-26 RX ORDER — LEVOTHYROXINE SODIUM 0.05 MG/1
50 TABLET ORAL
Qty: 90 TABLET | Refills: 3 | Status: SHIPPED | OUTPATIENT
Start: 2019-06-26 | End: 2020-02-27

## 2019-06-26 NOTE — TELEPHONE ENCOUNTER
Patient came in to request refills on simvastatin 40 MG Oral Tab and Levothyroxine Sodium 50 MCG Oral Tab needs [facility] [place]

## 2019-09-19 ENCOUNTER — TELEPHONE (OUTPATIENT)
Dept: FAMILY MEDICINE CLINIC | Facility: CLINIC | Age: 84
End: 2019-09-19

## 2019-09-19 NOTE — TELEPHONE ENCOUNTER
Verification for Prolia sent to [place] [id number] for Prolia approval. Per [employer], injection has been ordered and received. Per [facility], they reached out to [facility] and they were advised that they will not release benefit information to a 3rd party.  Mary Sampson

## 2019-09-19 NOTE — TELEPHONE ENCOUNTER
PA has been approved for 2 years from 9/19/2019-9/19/2021 through patient's medicare part B.        Approval Ref# [id number]

## 2019-09-25 ENCOUNTER — NURSE ONLY (OUTPATIENT)
Dept: FAMILY MEDICINE CLINIC | Facility: CLINIC | Age: 84
End: 2019-09-25
Payer: MEDICARE

## 2019-09-25 DIAGNOSIS — M81.0 POST-MENOPAUSAL OSTEOPOROSIS: Primary | ICD-10-CM

## 2019-09-25 PROCEDURE — 96372 THER/PROPH/DIAG INJ SC/IM: CPT | Performed by: FAMILY MEDICINE

## 2019-10-07 ENCOUNTER — IMMUNIZATION (OUTPATIENT)
Dept: FAMILY MEDICINE CLINIC | Facility: CLINIC | Age: 84
End: 2019-10-07
Payer: MEDICARE

## 2019-10-07 DIAGNOSIS — Z23 NEED FOR VACCINATION: ICD-10-CM

## 2019-10-07 PROCEDURE — G0008 ADMIN INFLUENZA VIRUS VAC: HCPCS | Performed by: FAMILY MEDICINE

## 2019-10-07 PROCEDURE — 90662 IIV NO PRSV INCREASED AG IM: CPT | Performed by: FAMILY MEDICINE

## 2020-02-27 ENCOUNTER — OFFICE VISIT (OUTPATIENT)
Dept: FAMILY MEDICINE CLINIC | Facility: CLINIC | Age: 85
End: 2020-02-27
Payer: MEDICARE

## 2020-02-27 VITALS
BODY MASS INDEX: 25.52 KG/M2 | DIASTOLIC BLOOD PRESSURE: 60 MMHG | WEIGHT: 130 LBS | SYSTOLIC BLOOD PRESSURE: 128 MMHG | HEIGHT: 59.84 IN | TEMPERATURE: 99 F | HEART RATE: 80 BPM

## 2020-02-27 DIAGNOSIS — Z79.899 ENCOUNTER FOR LONG-TERM CURRENT USE OF MEDICATION: ICD-10-CM

## 2020-02-27 DIAGNOSIS — E03.9 ACQUIRED HYPOTHYROIDISM: ICD-10-CM

## 2020-02-27 DIAGNOSIS — E55.9 VITAMIN D DEFICIENCY: ICD-10-CM

## 2020-02-27 DIAGNOSIS — J30.1 SEASONAL ALLERGIC RHINITIS DUE TO POLLEN: ICD-10-CM

## 2020-02-27 DIAGNOSIS — M81.0 POST-MENOPAUSAL OSTEOPOROSIS: ICD-10-CM

## 2020-02-27 DIAGNOSIS — E78.00 HYPERCHOLESTEROLEMIA: ICD-10-CM

## 2020-02-27 DIAGNOSIS — N39.3 STRESS INCONTINENCE OF URINE: ICD-10-CM

## 2020-02-27 DIAGNOSIS — Z00.00 MEDICARE ANNUAL WELLNESS VISIT, SUBSEQUENT: Primary | ICD-10-CM

## 2020-02-27 DIAGNOSIS — N18.30 CKD (CHRONIC KIDNEY DISEASE) STAGE 3, GFR 30-59 ML/MIN (HCC): ICD-10-CM

## 2020-02-27 DIAGNOSIS — M89.9 BONE DISORDER: ICD-10-CM

## 2020-02-27 DIAGNOSIS — E78.6 LOW HDL (UNDER 40): ICD-10-CM

## 2020-02-27 PROCEDURE — 99397 PER PM REEVAL EST PAT 65+ YR: CPT | Performed by: FAMILY MEDICINE

## 2020-02-27 PROCEDURE — 96160 PT-FOCUSED HLTH RISK ASSMT: CPT | Performed by: FAMILY MEDICINE

## 2020-02-27 PROCEDURE — G0439 PPPS, SUBSEQ VISIT: HCPCS | Performed by: FAMILY MEDICINE

## 2020-02-27 RX ORDER — LEVOTHYROXINE SODIUM 0.05 MG/1
50 TABLET ORAL
Qty: 90 TABLET | Refills: 3 | Status: SHIPPED | OUTPATIENT
Start: 2020-02-27 | End: 2020-08-25

## 2020-02-27 RX ORDER — SIMVASTATIN 40 MG
40 TABLET ORAL NIGHTLY
Qty: 90 TABLET | Refills: 3 | Status: SHIPPED | OUTPATIENT
Start: 2020-02-27

## 2020-02-27 NOTE — PROGRESS NOTES
HPI:   eSema Underwood is a 80year old female who presents for a MA (Medicare Advantage) Supervisit (Once per calendar year).            Fall/Risk Assessment   She has been screened for Falls and is low risk: Fall/Risk Scorin    Cognitive Assessm incontinence of urine    Wt Readings from Last 3 Encounters:  02/27/20 : 130 lb (59 kg)  02/21/19 : 128 lb (58.1 kg)  10/20/18 : 130 lb (59 kg)     Last Cholesterol Labs:   Lab Results   Component Value Date    CHOLEST 168 03/06/2020    HDL 54 03/06/2020 that she has never smoked. She has never used smokeless tobacco. She reports that she does not drink alcohol or use drugs. REVIEW OF SYSTEMS:   GENERAL: feels well overall.   Stays very active physically in general.  SKIN: denies any unusual skin lesion atraumatic, no cyanosis or edema   Pulses: 2+ and symmetric   Psych:  Normal affect   Lymph nodes:  No cervical or supraclavicular adenopathy   Neurologic: Normal       Vaccination History     Immunization History   Administered Date(s) Administered   • FL Dispense: 90 tablet; Refill: 3  - CBC WITH DIFFERENTIAL WITH PLATELET; Future  - ASSAY, THYROID STIM HORMONE; Future  - FREE T4 (FREE THYROXINE); Future    6. Post-menopausal osteoporosis  7.  Bone disorder  Patient counseled on calcium and vitamin D intake 6 months (around 8/27/2020) for Routine follow up. Sooner if needed. Aki Guerrero DO, 2/27/2020     General Health     In the past six months, have you lost more than 10 pounds without trying?: 2 - No  Has your appetite been poor?: No  How does th age 21-68 or Pap+HPV every 5 yrs age 33-67, age 72 and older at high risk There are no preventive care reminders to display for this patient.  Update Health Maintenance if applicable    Chlamydia  Annually if high risk No results found for: CHLAMYDIA No kortney

## 2020-02-27 NOTE — PATIENT INSTRUCTIONS
Arleth Pizarro's SCREENING SCHEDULE   Tests on this list are recommended by your physician but may not be covered, or covered at this frequency, by your insurer. Please check with your insurance carrier before scheduling to verify coverage.    PREVEN for this or any previous visit.  Limited to patients who meet one of the following criteria:   • Men who are 73-68 years old and have smoked more than 100 cigarettes in their lifetime   • Anyone with a family history    Colorectal Cancer Screening  Covered no preventive care reminders to display for this patient.  Please get this Mammogram regularly   Immunizations      Influenza  Covered Annually Orders placed or performed in visit on 10/07/19   • FLU VACC HIGH DOSE PRSV FREE   Orders placed or performed in review and print using their home computer and printer. (the forms are also available in [place]  [email or web address]  This link also has information from the [facility] regarding Advance Directives.

## 2020-03-06 ENCOUNTER — LAB ENCOUNTER (OUTPATIENT)
Dept: LAB | Age: 85
End: 2020-03-06
Attending: FAMILY MEDICINE
Payer: MEDICARE

## 2020-03-06 DIAGNOSIS — E55.9 VITAMIN D DEFICIENCY: ICD-10-CM

## 2020-03-06 DIAGNOSIS — E03.9 ACQUIRED HYPOTHYROIDISM: ICD-10-CM

## 2020-03-06 DIAGNOSIS — N18.30 CKD (CHRONIC KIDNEY DISEASE) STAGE 3, GFR 30-59 ML/MIN (HCC): ICD-10-CM

## 2020-03-06 DIAGNOSIS — M89.9 BONE DISORDER: ICD-10-CM

## 2020-03-06 DIAGNOSIS — E78.00 HYPERCHOLESTEROLEMIA: ICD-10-CM

## 2020-03-06 DIAGNOSIS — E78.6 LOW HDL (UNDER 40): ICD-10-CM

## 2020-03-06 DIAGNOSIS — M81.0 POST-MENOPAUSAL OSTEOPOROSIS: ICD-10-CM

## 2020-03-06 LAB
ALBUMIN SERPL-MCNC: 3.9 G/DL (ref 3.4–5)
ALBUMIN/GLOB SERPL: 1 {RATIO} (ref 1–2)
ALP LIVER SERPL-CCNC: 55 U/L (ref 55–142)
ALT SERPL-CCNC: 23 U/L (ref 13–56)
ANION GAP SERPL CALC-SCNC: 4 MMOL/L (ref 0–18)
AST SERPL-CCNC: 26 U/L (ref 15–37)
BASOPHILS # BLD AUTO: 0.08 X10(3) UL (ref 0–0.2)
BASOPHILS NFR BLD AUTO: 1.3 %
BILIRUB SERPL-MCNC: 0.6 MG/DL (ref 0.1–2)
BUN BLD-MCNC: 9 MG/DL (ref 7–18)
BUN/CREAT SERPL: 9.3 (ref 10–20)
CALCIUM BLD-MCNC: 9.2 MG/DL (ref 8.5–10.1)
CHLORIDE SERPL-SCNC: 107 MMOL/L (ref 98–112)
CHOLEST SMN-MCNC: 168 MG/DL (ref ?–200)
CO2 SERPL-SCNC: 27 MMOL/L (ref 21–32)
CREAT BLD-MCNC: 0.97 MG/DL (ref 0.55–1.02)
DEPRECATED RDW RBC AUTO: 45.6 FL (ref 35.1–46.3)
EOSINOPHIL # BLD AUTO: 0.15 X10(3) UL (ref 0–0.7)
EOSINOPHIL NFR BLD AUTO: 2.4 %
ERYTHROCYTE [DISTWIDTH] IN BLOOD BY AUTOMATED COUNT: 12.8 % (ref 11–15)
GLOBULIN PLAS-MCNC: 3.9 G/DL (ref 2.8–4.4)
GLUCOSE BLD-MCNC: 117 MG/DL (ref 70–99)
HCT VFR BLD AUTO: 41.9 % (ref 35–48)
HDLC SERPL-MCNC: 54 MG/DL (ref 40–59)
HGB BLD-MCNC: 13.6 G/DL (ref 12–16)
IMM GRANULOCYTES # BLD AUTO: 0.01 X10(3) UL (ref 0–1)
IMM GRANULOCYTES NFR BLD: 0.2 %
LDLC SERPL CALC-MCNC: 93 MG/DL (ref ?–100)
LYMPHOCYTES # BLD AUTO: 1.48 X10(3) UL (ref 1–4)
LYMPHOCYTES NFR BLD AUTO: 23.3 %
M PROTEIN MFR SERPL ELPH: 7.8 G/DL (ref 6.4–8.2)
MCH RBC QN AUTO: 31.7 PG (ref 26–34)
MCHC RBC AUTO-ENTMCNC: 32.5 G/DL (ref 31–37)
MCV RBC AUTO: 97.7 FL (ref 80–100)
MONOCYTES # BLD AUTO: 0.46 X10(3) UL (ref 0.1–1)
MONOCYTES NFR BLD AUTO: 7.3 %
NEUTROPHILS # BLD AUTO: 4.16 X10 (3) UL (ref 1.5–7.7)
NEUTROPHILS # BLD AUTO: 4.16 X10(3) UL (ref 1.5–7.7)
NEUTROPHILS NFR BLD AUTO: 65.5 %
NONHDLC SERPL-MCNC: 114 MG/DL (ref ?–130)
OSMOLALITY SERPL CALC.SUM OF ELEC: 286 MOSM/KG (ref 275–295)
PATIENT FASTING Y/N/NP: YES
PATIENT FASTING Y/N/NP: YES
PLATELET # BLD AUTO: 358 10(3)UL (ref 150–450)
POTASSIUM SERPL-SCNC: 4 MMOL/L (ref 3.5–5.1)
RBC # BLD AUTO: 4.29 X10(6)UL (ref 3.8–5.3)
SODIUM SERPL-SCNC: 138 MMOL/L (ref 136–145)
T4 FREE SERPL-MCNC: 1.1 NG/DL (ref 0.8–1.7)
TRIGL SERPL-MCNC: 103 MG/DL (ref 30–149)
TSI SER-ACNC: 2.26 MIU/ML (ref 0.36–3.74)
VIT D+METAB SERPL-MCNC: 30.2 NG/ML (ref 30–100)
VLDLC SERPL CALC-MCNC: 21 MG/DL (ref 0–30)
WBC # BLD AUTO: 6.3 X10(3) UL (ref 4–11)

## 2020-03-06 PROCEDURE — 84439 ASSAY OF FREE THYROXINE: CPT

## 2020-03-06 PROCEDURE — 84443 ASSAY THYROID STIM HORMONE: CPT

## 2020-03-06 PROCEDURE — 80061 LIPID PANEL: CPT

## 2020-03-06 PROCEDURE — 80053 COMPREHEN METABOLIC PANEL: CPT

## 2020-03-06 PROCEDURE — 85025 COMPLETE CBC W/AUTO DIFF WBC: CPT

## 2020-03-06 PROCEDURE — 36415 COLL VENOUS BLD VENIPUNCTURE: CPT

## 2020-03-06 PROCEDURE — 82306 VITAMIN D 25 HYDROXY: CPT

## 2020-03-07 PROBLEM — N39.3 STRESS INCONTINENCE OF URINE: Status: ACTIVE | Noted: 2020-03-07

## 2020-03-07 PROBLEM — E55.9 VITAMIN D DEFICIENCY: Status: ACTIVE | Noted: 2020-03-07

## 2020-03-10 ENCOUNTER — TELEPHONE (OUTPATIENT)
Dept: FAMILY MEDICINE CLINIC | Facility: CLINIC | Age: 85
End: 2020-03-10

## 2020-03-10 NOTE — TELEPHONE ENCOUNTER
PA initiated for Prolia. Last injection was 09/25/2019  Due 03/25/2020.      [address] [phone]  Procedure Code:   Dx:  M81.0    Reference# [id number]    Await 72hrs for response from review board

## 2020-03-12 NOTE — TELEPHONE ENCOUNTER
Prolia has been approved from 03/20/20 from 12/31/2020. Approved EOC number: [id number]      Called patient to advise she can schedule. States Dr. Jay Roland advised during last OV she no longer needs Prolia therapy. No appointment made.

## 2020-06-05 ENCOUNTER — TELEPHONE (OUTPATIENT)
Dept: FAMILY MEDICINE CLINIC | Facility: CLINIC | Age: 85
End: 2020-06-05

## 2020-06-05 ENCOUNTER — VIRTUAL PHONE E/M (OUTPATIENT)
Dept: FAMILY MEDICINE CLINIC | Facility: CLINIC | Age: 85
End: 2020-06-05
Payer: MEDICARE

## 2020-06-05 DIAGNOSIS — N30.00 ACUTE CYSTITIS WITHOUT HEMATURIA: Primary | ICD-10-CM

## 2020-06-05 PROCEDURE — 99442 PHONE E/M BY PHYS 11-20 MIN: CPT | Performed by: FAMILY MEDICINE

## 2020-06-05 RX ORDER — SULFAMETHOXAZOLE AND TRIMETHOPRIM 800; 160 MG/1; MG/1
1 TABLET ORAL 2 TIMES DAILY
Qty: 14 TABLET | Refills: 0 | Status: SHIPPED | OUTPATIENT
Start: 2020-06-05 | End: 2020-06-12

## 2020-06-05 RX ORDER — PHENAZOPYRIDINE HYDROCHLORIDE 200 MG/1
200 TABLET, FILM COATED ORAL 3 TIMES DAILY PRN
Qty: 6 TABLET | Refills: 0 | Status: SHIPPED | OUTPATIENT
Start: 2020-06-05 | End: 2020-06-11 | Stop reason: ALTCHOICE

## 2020-06-05 NOTE — TELEPHONE ENCOUNTER
Patient reports burning and pain with urination, increased urgency and frequency x2 days. Denies fever and blood in urine. Ok per Dr. Precious Zhang to add to schedule for today for phone consultation. Patient agreed with plan.

## 2020-06-05 NOTE — PROGRESS NOTES
Virtual/Telephone Check-In    Cecil Phelps verbally consents to a Virtual/Telephone Check-In service on 06/05/20. Patient has been referred to the [facility] website at [email or web address] to review the yearly Consent to Treat document.   Patient u Levothyroxine Sodium 50 MCG Oral Tab Take 1 tablet (50 mcg total) by mouth before breakfast. 90 tablet 3   • Calcium Carbonate-Vitamin D (CALTRATE 600+D OR) Take 1 tablet by mouth daily. • aspirin 81 MG Oral Tab Take 81 mg by mouth daily.      • Cetiriz hematuria  We will treat empirically for presumed acute cystitis. Prescription for Bactrim DS. Increase water intake, okay to drink cranberry juice.   If not improving, or if worsening, or if returns, patient to call me back, discussed with patient at sun

## 2020-06-09 ENCOUNTER — TELEPHONE (OUTPATIENT)
Dept: FAMILY MEDICINE CLINIC | Facility: CLINIC | Age: 85
End: 2020-06-09

## 2020-06-09 NOTE — TELEPHONE ENCOUNTER
Pt called and said she needs to see Dr. Jose Barragan ASAP because she is having kidney issues. Dr. Jose Barragan doesn't have anything available until 6/26/20 in the office.

## 2020-06-09 NOTE — TELEPHONE ENCOUNTER
Relayed recommendations to patient. She declines nurse visit and phone appointment at this time. States she will call back if her symptoms get worse.

## 2020-06-09 NOTE — TELEPHONE ENCOUNTER
Spoke with patient. States she is still taking the antibiotic as prescribed. Denies increased urgency or frequency, denies blood in urine, painful urination, abdominal pain, low back/flank pain.      States her only concern is she does not get the urge to

## 2020-06-09 NOTE — TELEPHONE ENCOUNTER
Vitual appointment 6/5/20  1. Acute cystitis without hematuria  We will treat empirically for presumed acute cystitis. Prescription for Bactrim DS. Increase water intake, okay to drink cranberry juice.   If not improving, or if worsening, or if returns, p

## 2020-06-09 NOTE — TELEPHONE ENCOUNTER
Please inform patient that based on current symptoms voiding less frequently is not worrisome. Please have patient come to the office for a nurse visit for a clean-catch urinalysis, and schedule a phone visit.

## 2020-06-11 ENCOUNTER — VIRTUAL PHONE E/M (OUTPATIENT)
Dept: FAMILY MEDICINE CLINIC | Facility: CLINIC | Age: 85
End: 2020-06-11
Payer: MEDICARE

## 2020-06-11 DIAGNOSIS — N30.00 ACUTE CYSTITIS WITHOUT HEMATURIA: Primary | ICD-10-CM

## 2020-06-11 DIAGNOSIS — T88.7XXA MEDICATION SIDE EFFECT: ICD-10-CM

## 2020-06-11 PROCEDURE — 99442 PHONE E/M BY PHYS 11-20 MIN: CPT | Performed by: FAMILY MEDICINE

## 2020-06-11 NOTE — PROGRESS NOTES
Virtual/Telephone Check-In    Dieli Anderson verbally consents to a Virtual/Telephone Check-In service on 06/11/20. Patient has been referred to the [facility] website at [email or web address] to review the yearly Consent to Treat document.   Patient u mg total) by mouth nightly. (Patient not taking: Reported on 6/11/2020 ) 90 tablet 3   • Calcium Carbonate-Vitamin D (CALTRATE 600+D OR) Take 1 tablet by mouth daily.         Patient Active Problem List:     Hypercholesterolemia     Low HDL (under 40)     A associated with nausea and vomiting. Return if symptoms worsen or fail to improve.

## 2020-06-18 ENCOUNTER — TELEPHONE (OUTPATIENT)
Dept: FAMILY MEDICINE CLINIC | Facility: CLINIC | Age: 85
End: 2020-06-18

## 2020-06-18 NOTE — TELEPHONE ENCOUNTER
Pt called and asked to speak with Dr. Kaden Carrero. I told her she was seeing pt's but I could take a message. She told me she could not urinate so I placed her on hold and told her I will speak with Dr. Kaden Carrero.   Dr. Kaden Carrero said she was to go to the ER ASAP

## 2020-06-22 ENCOUNTER — TELEPHONE (OUTPATIENT)
Dept: FAMILY MEDICINE CLINIC | Facility: CLINIC | Age: 85
End: 2020-06-22

## 2020-06-22 NOTE — TELEPHONE ENCOUNTER
Patient called states she can not keep anything down, has not has a bowel movement in several days and can not urinate. Patient wants a call ASAP.

## 2020-06-22 NOTE — TELEPHONE ENCOUNTER
Patient confirms she went to [address] [facility] as directed on 6/18/20, she said they kept her for a couple of days and they told her everything was normal and advised she follow up with PCP and drink more water

## 2020-06-22 NOTE — TELEPHONE ENCOUNTER
Patient states she was constipated x2-3 days. Reports she used an enema and she feels better now.  Reports BM are now normal.    Patient reports now she is concerned with decrease urination, states she is only going every 2-4 hours which is less frequent th

## 2020-06-23 NOTE — TELEPHONE ENCOUNTER
Called [facility] medical record department and requested records be faxed to our office. Per patient, she also had them faxed over results and progress notes. Patient agreed with recommendations and agreed to phone appointment for tomorrow.  States sh

## 2020-06-23 NOTE — TELEPHONE ENCOUNTER
Please obtain discharge summary and results of testing from patient's recent hospital stay. Also, schedule patient for phone visit.

## 2020-06-24 ENCOUNTER — VIRTUAL PHONE E/M (OUTPATIENT)
Dept: FAMILY MEDICINE CLINIC | Facility: CLINIC | Age: 85
End: 2020-06-24
Payer: MEDICARE

## 2020-06-24 DIAGNOSIS — R33.9 URINARY RETENTION: ICD-10-CM

## 2020-06-24 DIAGNOSIS — Z09 HOSPITAL DISCHARGE FOLLOW-UP: Primary | ICD-10-CM

## 2020-06-24 DIAGNOSIS — E87.1 HYPONATREMIA: ICD-10-CM

## 2020-06-24 PROCEDURE — 99443 PHONE E/M BY PHYS 21-30 MIN: CPT | Performed by: FAMILY MEDICINE

## 2020-06-24 PROCEDURE — 1111F DSCHRG MED/CURRENT MED MERGE: CPT | Performed by: FAMILY MEDICINE

## 2020-06-24 RX ORDER — SODIUM CHLORIDE 1000 MG
1 TABLET, SOLUBLE MISCELLANEOUS DAILY
Qty: 120 TABLET | Refills: 0 | COMMUNITY
Start: 2020-06-21

## 2020-06-24 NOTE — PROGRESS NOTES
Virtual/Telephone Check-In    Seema Underwood verbally consents to a Virtual/Telephone Check-In service on 06/24/20. Patient has been referred to the [facility] website at [email or web address] to review the yearly Consent to Treat document.   Patient u tablet 0   • simvastatin 40 MG Oral Tab Take 1 tablet (40 mg total) by mouth nightly.  90 tablet 3   • Levothyroxine Sodium 50 MCG Oral Tab Take 1 tablet (50 mcg total) by mouth before breakfast. 90 tablet 3   • Calcium Carbonate-Vitamin D (CALTRATE 600+D O normal.  No pressured speech.       ASSESSMENT AND PLAN:     Hospital discharge follow-up  (primary encounter diagnosis)  Urinary retention  Hyponatremia    Allyson Wasserman 35 has been contacted by our staff 2-3 times to obtain medical r

## 2020-07-17 ENCOUNTER — TELEPHONE (OUTPATIENT)
Dept: FAMILY MEDICINE CLINIC | Facility: CLINIC | Age: 85
End: 2020-07-17

## 2020-07-17 NOTE — TELEPHONE ENCOUNTER
Spoke to patient. Advised to keep in contact with Nephrologist and to proceed to ER at [facility] in [place] if emergency over the weekend.

## 2020-07-17 NOTE — TELEPHONE ENCOUNTER
Spoke to patient. Noticed today that both ankles are swollen. Up to ankle. Pitting. States sometimes she is able to urinate but sometimes not. Not uncomfortable at this time. No SOB or chest pain.   States she had lab work today at [place] [facility]

## 2020-07-17 NOTE — TELEPHONE ENCOUNTER
Rancho Taylor is calling because she cannot release her water, her legs and feet are swelling up, she wants to come in to see Dr Sean Nunez or see what Dr Sean Nunez wants her to do. Please call Rancho Taylor at [phone]

## 2020-07-27 ENCOUNTER — TELEPHONE (OUTPATIENT)
Dept: FAMILY MEDICINE CLINIC | Facility: CLINIC | Age: 85
End: 2020-07-27

## 2020-07-27 NOTE — TELEPHONE ENCOUNTER
Please call patient, nephrologist will decide whether or not patient needs to continue this medication and if so they will prescribe it for her.

## 2020-07-27 NOTE — TELEPHONE ENCOUNTER
Patient has appointment with nephrologist today at 1pm.  She is not sure if he handles this medication. Please advise.

## 2020-07-27 NOTE — TELEPHONE ENCOUNTER
Wally is calling to get her sodium chloride 1 g Oral Tab refilled to her [address] on [facility]. Please call ananda with any questions at [phone]

## 2020-08-25 DIAGNOSIS — E03.9 ACQUIRED HYPOTHYROIDISM: ICD-10-CM

## 2020-08-25 DIAGNOSIS — Z79.899 ENCOUNTER FOR LONG-TERM CURRENT USE OF MEDICATION: ICD-10-CM

## 2020-08-25 RX ORDER — LEVOTHYROXINE SODIUM 0.05 MG/1
50 TABLET ORAL
Qty: 90 TABLET | Refills: 3 | Status: SHIPPED | OUTPATIENT
Start: 2020-08-25

## 2020-08-25 NOTE — TELEPHONE ENCOUNTER
Patient came in states she needs a refill on Levothyroxine Sodium 50 MCG Oral Tab sent to her [facility].  Advised patient she may still have refills but she states she believes she filled them all and misplaced one of her packages and now only has en

## 2020-09-10 ENCOUNTER — OFFICE VISIT (OUTPATIENT)
Dept: FAMILY MEDICINE CLINIC | Facility: CLINIC | Age: 85
End: 2020-09-10
Payer: MEDICARE

## 2020-09-10 VITALS
HEART RATE: 84 BPM | TEMPERATURE: 98 F | BODY MASS INDEX: 24.54 KG/M2 | SYSTOLIC BLOOD PRESSURE: 118 MMHG | DIASTOLIC BLOOD PRESSURE: 62 MMHG | OXYGEN SATURATION: 95 % | HEIGHT: 59.84 IN | WEIGHT: 125 LBS

## 2020-09-10 DIAGNOSIS — E78.00 HYPERCHOLESTEROLEMIA: Primary | ICD-10-CM

## 2020-09-10 DIAGNOSIS — E87.1 HYPONATREMIA: ICD-10-CM

## 2020-09-10 DIAGNOSIS — K59.09 CHRONIC CONSTIPATION: ICD-10-CM

## 2020-09-10 DIAGNOSIS — Z23 FLU VACCINE NEED: ICD-10-CM

## 2020-09-10 DIAGNOSIS — E03.9 ACQUIRED HYPOTHYROIDISM: ICD-10-CM

## 2020-09-10 PROBLEM — E87.6 HYPOKALEMIA: Status: ACTIVE | Noted: 2020-06-29

## 2020-09-10 PROBLEM — E87.6 HYPOKALEMIA: Status: RESOLVED | Noted: 2020-06-29 | Resolved: 2020-09-10

## 2020-09-10 PROBLEM — R33.9 URINARY RETENTION: Status: RESOLVED | Noted: 2020-06-24 | Resolved: 2020-09-10

## 2020-09-10 PROBLEM — N30.00 ACUTE CYSTITIS WITHOUT HEMATURIA: Status: RESOLVED | Noted: 2020-06-05 | Resolved: 2020-09-10

## 2020-09-10 PROCEDURE — 3008F BODY MASS INDEX DOCD: CPT | Performed by: FAMILY MEDICINE

## 2020-09-10 PROCEDURE — 90662 IIV NO PRSV INCREASED AG IM: CPT | Performed by: FAMILY MEDICINE

## 2020-09-10 PROCEDURE — 3078F DIAST BP <80 MM HG: CPT | Performed by: FAMILY MEDICINE

## 2020-09-10 PROCEDURE — 99214 OFFICE O/P EST MOD 30 MIN: CPT | Performed by: FAMILY MEDICINE

## 2020-09-10 PROCEDURE — G0008 ADMIN INFLUENZA VIRUS VAC: HCPCS | Performed by: FAMILY MEDICINE

## 2020-09-10 PROCEDURE — 3074F SYST BP LT 130 MM HG: CPT | Performed by: FAMILY MEDICINE

## 2020-09-10 RX ORDER — FUROSEMIDE 20 MG/1
20 TABLET ORAL EVERY OTHER DAY
COMMUNITY
Start: 2020-08-25

## 2020-09-10 RX ORDER — SENNOSIDES 8.6 MG
650 CAPSULE ORAL AS NEEDED
COMMUNITY

## 2020-09-10 NOTE — PROGRESS NOTES
Mayda Kaufman is a 80year old female. HPI:       No new complaints. Patient seeing nephrologist for hyponatremia, and fluid overload.         Wt Readings from Last 6 Encounters:  09/10/20 : 125 lb (56.7 kg)  02/27/20 : 130 lb (59 kg)  02/21/19 Disease Paternal Grandmother    • Cancer Maternal Grandfather         stomach cancer   • Stroke Maternal Grandmother    • Cancer Brother         colon cancer   • Cancer Brother         colon cancer   • Cancer Sister         lung cancer     REVIEW OF SYSTEM [id number]   Triglycerides      30 - 149 mg/dL 103 105 121   HDL Cholesterol      40 - 59 mg/dL 54 53 53   LDL Cholesterol Calc      <100 mg/dL 93 52 61   VLDL      0 - 30 mg/dL 21 21 24   Chol/HDL Ratio      <4.44   2.60   NON HDL CHOL      <130 mg/dL 11
